# Patient Record
Sex: FEMALE | Race: WHITE | NOT HISPANIC OR LATINO | Employment: PART TIME | URBAN - METROPOLITAN AREA
[De-identification: names, ages, dates, MRNs, and addresses within clinical notes are randomized per-mention and may not be internally consistent; named-entity substitution may affect disease eponyms.]

---

## 2023-12-15 ENCOUNTER — HOSPITAL ENCOUNTER (OUTPATIENT)
Facility: HOSPITAL | Age: 63
Discharge: HOME | End: 2023-12-15
Attending: EMERGENCY MEDICINE | Admitting: EMERGENCY MEDICINE
Payer: MEDICARE

## 2023-12-15 VITALS
DIASTOLIC BLOOD PRESSURE: 93 MMHG | HEART RATE: 90 BPM | WEIGHT: 160 LBS | SYSTOLIC BLOOD PRESSURE: 128 MMHG | BODY MASS INDEX: 24.25 KG/M2 | OXYGEN SATURATION: 98 % | HEIGHT: 68 IN | RESPIRATION RATE: 18 BRPM | TEMPERATURE: 98.7 F

## 2023-12-15 DIAGNOSIS — U07.1 COVID-19: Primary | ICD-10-CM

## 2023-12-15 PROCEDURE — G0463 HOSPITAL OUTPT CLINIC VISIT: HCPCS | Performed by: EMERGENCY MEDICINE

## 2023-12-15 RX ORDER — IBUPROFEN 600 MG/1
600 TABLET ORAL 3 TIMES DAILY
Qty: 30 TABLET | Refills: 0 | Status: SHIPPED | OUTPATIENT
Start: 2023-12-15

## 2023-12-15 RX ORDER — CARISOPRODOL 250 MG/1
1 TABLET ORAL NIGHTLY
COMMUNITY

## 2023-12-15 RX ORDER — BENZONATATE 200 MG/1
200 CAPSULE ORAL 3 TIMES DAILY PRN
Qty: 10 CAPSULE | Refills: 0 | Status: SHIPPED | OUTPATIENT
Start: 2023-12-15 | End: 2023-12-20

## 2023-12-15 RX ORDER — DIAZEPAM 10 MG/1
10 TABLET ORAL 2 TIMES DAILY PRN
COMMUNITY

## 2023-12-15 RX ORDER — HYDROCODONE BITARTRATE AND ACETAMINOPHEN 5; 325 MG/1; MG/1
1 TABLET ORAL EVERY 6 HOURS PRN
COMMUNITY

## 2023-12-15 RX ORDER — GABAPENTIN 800 MG/1
800 TABLET ORAL 4 TIMES DAILY
COMMUNITY

## 2023-12-15 NOTE — FSED PROVIDER NOTE
Subjective   History of Present Illness  63-year-old female presenting to the urgent care with chief complaint of I got COVID.  Patient states that she developed body aches and fatigue alongside cough and congestion.  Symptoms started 1 day ago.  Worse today.  Patient states she took a COVID test this morning which was positive.  Patient presents with the COVID test results.        Review of Systems   Constitutional:  Positive for chills and fatigue.   HENT:  Positive for congestion.    Respiratory:  Positive for cough.    Gastrointestinal: Negative.    Endocrine: Negative.    Genitourinary: Negative.    All other systems reviewed and are negative.      Past Medical History:   Diagnosis Date    DDD (degenerative disc disease), cervical     Trigeminal neuralgia        No Known Allergies    Past Surgical History:   Procedure Laterality Date    BRAIN SURGERY       SECTION         History reviewed. No pertinent family history.    Social History     Socioeconomic History    Marital status: Single   Tobacco Use    Smoking status: Every Day     Types: Cigarettes   Substance and Sexual Activity    Alcohol use: Not Currently    Drug use: Yes     Types: Marijuana     Comment: daily, smoke           Objective   Physical Exam  Vitals and nursing note reviewed.   Constitutional:       Appearance: She is obese.   HENT:      Head: Normocephalic and atraumatic.      Right Ear: Tympanic membrane normal.      Left Ear: Tympanic membrane normal.      Nose: Congestion and rhinorrhea present.   Eyes:      Extraocular Movements: Extraocular movements intact.      Pupils: Pupils are equal, round, and reactive to light.   Cardiovascular:      Rate and Rhythm: Normal rate.   Pulmonary:      Effort: Pulmonary effort is normal.      Breath sounds: Normal breath sounds.   Musculoskeletal:         General: Normal range of motion.      Cervical back: Normal range of motion and neck supple.   Skin:     General: Skin is warm.       Capillary Refill: Capillary refill takes less than 2 seconds.   Neurological:      General: No focal deficit present.      Mental Status: She is alert and oriented to person, place, and time.   Psychiatric:         Mood and Affect: Mood normal.         Behavior: Behavior normal.         Procedures           ED Course                                           Medical Decision Making  Problems Addressed:  COVID-19: complicated acute illness or injury    Risk  Prescription drug management.        Final diagnoses:   COVID-19       ED Disposition  ED Disposition       ED Disposition   Discharge    Condition   Stable    Comment   --               PATIENT CONNECTION - [facility] [address]  [phone]  In 2 days  or call your PMD, As needed         Medication List        New Prescriptions      benzonatate 200 MG capsule  Commonly known as: TESSALON  Take 1 capsule by mouth 3 (Three) Times a Day As Needed for Cough for up to 5 days.     ibuprofen 600 MG tablet  Commonly known as: ADVIL,MOTRIN  Take 1 tablet by mouth 3 (Three) Times a Day.     Nirmatrelvir & Ritonavir (300mg/100mg) 20 x 150 MG & 10 x 100MG tablet therapy pack tablet  Commonly known as: PAXLOVID  Take 3 tablets by mouth 2 (Two) Times a Day.               Where to Get Your Medications        These medications were sent to [facility] [id number] - [facility] [place] - [address] [facility] - [phone]  - [phone] [facility] [facility] [place] [address]      Phone: [phone]   benzonatate 200 MG capsule  ibuprofen 600 MG tablet  Nirmatrelvir & Ritonavir (300mg/100mg) 20 x 150 MG & 10 x 100MG tablet therapy pack tablet

## 2023-12-15 NOTE — Clinical Note
[facility] [place] [address] [place] [address] [place] [address]  Phone: [phone]    Marianne Cassidy was seen and treated in our emergency department on 12/15/2023.  She may return to work on 12/21/2023.         Thank you for choosing [facility].    Matthew Gamez MD

## 2025-03-21 ENCOUNTER — TRANSCRIBE ORDERS (OUTPATIENT)
Dept: ADMINISTRATIVE | Facility: HOSPITAL | Age: 65
End: 2025-03-21
Payer: MEDICARE

## 2025-03-21 DIAGNOSIS — R09.89 OTHER SPECIFIED SYMPTOMS AND SIGNS INVOLVING THE CIRCULATORY AND RESPIRATORY SYSTEMS: Primary | ICD-10-CM
